# Patient Record
Sex: FEMALE | Race: WHITE | HISPANIC OR LATINO | Employment: FULL TIME | URBAN - METROPOLITAN AREA
[De-identification: names, ages, dates, MRNs, and addresses within clinical notes are randomized per-mention and may not be internally consistent; named-entity substitution may affect disease eponyms.]

---

## 2024-05-31 ENCOUNTER — OFFICE VISIT (OUTPATIENT)
Dept: SPORTS MEDICINE | Facility: CLINIC | Age: 44
End: 2024-05-31
Payer: COMMERCIAL

## 2024-05-31 VITALS
RESPIRATION RATE: 16 BRPM | BODY MASS INDEX: 24.99 KG/M2 | HEIGHT: 65 IN | WEIGHT: 150 LBS | OXYGEN SATURATION: 100 % | TEMPERATURE: 98 F | HEART RATE: 64 BPM

## 2024-05-31 DIAGNOSIS — M51.36 DDD (DEGENERATIVE DISC DISEASE), LUMBAR: ICD-10-CM

## 2024-05-31 DIAGNOSIS — M54.16 RIGHT LUMBAR RADICULITIS: ICD-10-CM

## 2024-05-31 DIAGNOSIS — M54.50 LUMBAR PAIN: Primary | ICD-10-CM

## 2024-05-31 RX ORDER — GABAPENTIN 300 MG/1
300 CAPSULE ORAL NIGHTLY
Qty: 30 CAPSULE | Refills: 0 | Status: SHIPPED | OUTPATIENT
Start: 2024-05-31

## 2024-05-31 RX ORDER — METHYLPREDNISOLONE ACETATE 80 MG/ML
80 INJECTION, SUSPENSION INTRA-ARTICULAR; INTRALESIONAL; INTRAMUSCULAR; SOFT TISSUE ONCE
Status: COMPLETED | OUTPATIENT
Start: 2024-05-31 | End: 2024-05-31

## 2024-05-31 RX ORDER — PREDNISONE 20 MG/1
TABLET ORAL
Qty: 20 TABLET | Refills: 0 | Status: SHIPPED | OUTPATIENT
Start: 2024-05-31

## 2024-05-31 RX ORDER — NORETHINDRONE ACETATE AND ETHINYL ESTRADIOL, ETHINYL ESTRADIOL AND FERROUS FUMARATE 1MG-10(24)
1 KIT ORAL DAILY
COMMUNITY

## 2024-05-31 RX ADMIN — METHYLPREDNISOLONE ACETATE 80 MG: 80 INJECTION, SUSPENSION INTRA-ARTICULAR; INTRALESIONAL; INTRAMUSCULAR; SOFT TISSUE at 13:50

## 2024-05-31 NOTE — PROGRESS NOTES
"Evelin is a 43 y.o. year old female presents to [facility]    Chief Complaint   Patient presents with    Back Pain     Lower back pain since stretching on Monday. Pain and paresthesia down her right leg  to her foot.         History of Present Illness  History of Present Illness  The patient is a 43-year-old female who presents for evaluation of back pain. She is accompanied by her mother.    The patient experienced a sudden, sharp pain in her back while performing stretching exercises on Monday morning. The pain was severe enough to prevent her from getting out of bed, making it challenging to ambulate. The pain, which she describes as radiating from her back down her leg, is particularly severe in her right buttock. This is the first occurrence of such pain. Despite her active lifestyle, she recently completed a marathon. She finds comfort when lying on her left side and sitting for extended periods. On Tuesday, she experienced a near-syncopal episode upon standing, which was alleviated by ibuprofen. Since then, she has experienced episodes of diaphoresis, visual disturbances, and near-fall while attempting to use the bathroom. She has been managing her pain with ibuprofen every 4 hours and icing the affected area. She reports difficulty in defecating due to discomfort while sitting, but denies any loss of bowel control.    I have reviewed the patient's medical, family, and social history in detail and updated the computerized patient record.    Pulse 64   Temp 98 °F (36.7 °C)   Resp 16   Ht 165.1 cm (65\")   Wt 68 kg (150 lb)   SpO2 100%   BMI 24.96 kg/m²      Physical Exam    Vital signs reviewed.   General: No acute distress.  Eyes: conjunctiva clear; pupils equally round and reactive  ENT: external ears atraumatic  CV: no peripheral edema  Resp: normal respiratory effort, no use of accessory muscles  Skin: no rashes or wounds; normal turgor  Psych: mood and affect " appropriate; recent and remote memory intact  Neuro: sensation to light touch intact    MSK Exam  Physical Exam  Lumbar spine demonstrates no paraspinal tenderness. Positive seated slump test on right, negative on the left. Paresthesias extending along the S1 dermatome down to the toe. 2+ DTR L4-S1 bilateral.    Lumbar Spine X-Ray  Indication: Pain  Views: AP and Lateral    Findings:  No fracture  No bony lesion  Normal soft tissues  DDD L5-S1    No prior studies were available for comparison.        Results  Imaging  Degenerative disc at L5-S1.         Diagnoses and all orders for this visit:    Lumbar pain  -     XR Spine Lumbar 2 or 3 View; Future  -     XR Spine Lumbar 2 or 3 View  -     methylPREDNISolone acetate (DEPO-medrol) injection 80 mg  -     predniSONE (DELTASONE) 20 MG tablet; Take 3 tabs daily for 3 d, then take 2 tabs daily for 3 d, then take 1 tab daily for 3 d, then take 0.5 tab daily for 3 d then stop  -     gabapentin (NEURONTIN) 300 MG capsule; Take 1 capsule by mouth Every Night.    Right lumbar radiculitis  -     predniSONE (DELTASONE) 20 MG tablet; Take 3 tabs daily for 3 d, then take 2 tabs daily for 3 d, then take 1 tab daily for 3 d, then take 0.5 tab daily for 3 d then stop  -     gabapentin (NEURONTIN) 300 MG capsule; Take 1 capsule by mouth Every Night.    DDD (degenerative disc disease), lumbar  -     predniSONE (DELTASONE) 20 MG tablet; Take 3 tabs daily for 3 d, then take 2 tabs daily for 3 d, then take 1 tab daily for 3 d, then take 0.5 tab daily for 3 d then stop  -     gabapentin (NEURONTIN) 300 MG capsule; Take 1 capsule by mouth Every Night.    Other orders  -     Lo Loestrin Fe 1 MG-10 MCG / 10 MCG tablet; Take 1 tablet by mouth Daily.  -     Acetylcysteine 500 MG capsule; Take 1 capsule by mouth.      Assessment & Plan  1. Back pain.  The patient's x-ray reveals degenerative discs at L5-S1. A steroid injection will be administered today, followed by a 12-day course of  prednisone. Gabapentin will be prescribed for nerve pain, to be taken at bedtime. The patient is advised to continue with ibuprofen throughout the day.    Follow-up  A follow-up appointment is scheduled for early next week.  Procedure: Following administration of 80 mg IM Depo-Medrol, she felt better. She was able to ambulate out of the office with minimal difficulty.          Follow Up     Patient was given instructions and counseling regarding her condition or for health maintenance advice. Please see specific information pulled into the AVS if appropriate.     Patient or patient representative verbalized consent for the use of Ambient Listening during the visit with  MALINA Zurita Jr., DO for chart documentation. 5/31/2024  14:26 EDT

## 2024-06-05 ENCOUNTER — OFFICE VISIT (OUTPATIENT)
Dept: SPORTS MEDICINE | Facility: CLINIC | Age: 44
End: 2024-06-05
Payer: COMMERCIAL

## 2024-06-05 VITALS
DIASTOLIC BLOOD PRESSURE: 60 MMHG | SYSTOLIC BLOOD PRESSURE: 120 MMHG | WEIGHT: 150 LBS | BODY MASS INDEX: 24.99 KG/M2 | HEART RATE: 65 BPM | OXYGEN SATURATION: 99 % | HEIGHT: 65 IN | RESPIRATION RATE: 16 BRPM

## 2024-06-05 DIAGNOSIS — M51.36 DDD (DEGENERATIVE DISC DISEASE), LUMBAR: ICD-10-CM

## 2024-06-05 DIAGNOSIS — M54.16 RIGHT LUMBAR RADICULITIS: ICD-10-CM

## 2024-06-05 DIAGNOSIS — M54.50 LUMBAR PAIN: Primary | ICD-10-CM

## 2024-06-05 PROCEDURE — 99213 OFFICE O/P EST LOW 20 MIN: CPT | Performed by: FAMILY MEDICINE

## 2024-06-05 RX ORDER — DEXTROAMPHETAMINE SACCHARATE, AMPHETAMINE ASPARTATE, DEXTROAMPHETAMINE SULFATE AND AMPHETAMINE SULFATE 3.75; 3.75; 3.75; 3.75 MG/1; MG/1; MG/1; MG/1
15 TABLET ORAL 3 TIMES DAILY
COMMUNITY
Start: 2024-05-28

## 2024-06-05 NOTE — PROGRESS NOTES
"Evelin is a 43 y.o. year old female presents to [facility]    Chief Complaint   Patient presents with    Lumbar Spine - Follow-up, Pain     F/u eval for lumbar pain with DDD - reports she is feeling better but still having some pain radiating into bilateral lower legs, able to ambulate today without assistance - here for further evaluation and treatment        History of Present Illness  History of Present Illness  The patient is a 43-year-old female who presents for evaluation of back pain.    The patient reports a significant improvement in her condition compared to the previous visit, attributing it to the efficacy of her prescribed medications. Initially, she perceived a slow recovery, however, she has noticed an increased comfort in her mobility over the past two days. Her sleep quality is suboptimal, which she attributes to her parents' couch and preparing to return home today. She has not yet resumed running or engaging in any physical activities due to her inability to sit up for extended periods. Standing is tolerable, however, she experiences aching. Over the past two days, the radiating pain down her leg has lessened. She has transitioned from running to walking laps around her kitchen. She reports no new symptoms, but acknowledges a tingling sensation that has since spread. Her primary concern at present is her ability to engage in more activities. She experienced significant lower back pain during training and running, which she initially attributed to aches and pains.    I have reviewed the patient's medical, family, and social history in detail and updated the computerized patient record.    /60 (BP Location: Right arm, Patient Position: Standing, Cuff Size: Adult)   Pulse 65   Resp 16   Ht 165.1 cm (65\")   Wt 68 kg (150 lb)   SpO2 99%   BMI 24.96 kg/m²      Physical Exam    Vital signs reviewed.   General: No acute distress.  Eyes: conjunctiva clear; " pupils equally round and reactive  ENT: external ears atraumatic  CV: no peripheral edema  Resp: normal respiratory effort, no use of accessory muscles  Skin: no rashes or wounds; normal turgor  Psych: mood and affect appropriate; recent and remote memory intact  Neuro: sensation to light touch intact    MSK Exam  Physical Exam  Lumbar spine demonstrates positive straight leg raise on the right, negative on the left. Negative LAKSHMI and FADIR. Negative Stinchfield bilateral. 2+ DTR L4-S1 bilateral.          Results           Diagnoses and all orders for this visit:    Lumbar pain  -     MRI Lumbar Spine Without Contrast; Future    DDD (degenerative disc disease), lumbar  -     MRI Lumbar Spine Without Contrast; Future    Right lumbar radiculitis  -     MRI Lumbar Spine Without Contrast; Future    Other orders  -     amphetamine-dextroamphetamine (ADDERALL) 15 MG tablet; Take 1 tablet by mouth 3 (Three) Times a Day.      Assessment & Plan  1. Back pain.  An MRI has been ordered to further investigate the cause of the back pain.    Follow-up  A follow-up appointment will be scheduled post-MRI.          Follow Up     Patient was given instructions and counseling regarding her condition or for health maintenance advice. Please see specific information pulled into the AVS if appropriate.     Patient or patient representative verbalized consent for the use of Ambient Listening during the visit with  MALINA Zurita Jr., DO for chart documentation. 6/5/2024  10:08 EDT

## 2024-06-11 DIAGNOSIS — M51.36 DDD (DEGENERATIVE DISC DISEASE), LUMBAR: ICD-10-CM

## 2024-06-11 DIAGNOSIS — M54.50 LUMBAR PAIN: ICD-10-CM

## 2024-06-11 DIAGNOSIS — M54.16 RIGHT LUMBAR RADICULITIS: ICD-10-CM

## 2024-06-11 RX ORDER — PREDNISONE 20 MG/1
TABLET ORAL
Qty: 20 TABLET | Refills: 0 | Status: SHIPPED | OUTPATIENT
Start: 2024-06-11

## 2024-06-12 ENCOUNTER — TELEPHONE (OUTPATIENT)
Dept: SPORTS MEDICINE | Facility: CLINIC | Age: 44
End: 2024-06-12
Payer: COMMERCIAL

## 2024-06-12 ENCOUNTER — HOSPITAL ENCOUNTER (OUTPATIENT)
Dept: MRI IMAGING | Facility: HOSPITAL | Age: 44
Discharge: HOME | End: 2024-06-12
Admitting: FAMILY MEDICINE
Payer: COMMERCIAL

## 2024-06-12 DIAGNOSIS — M54.16 RIGHT LUMBAR RADICULITIS: ICD-10-CM

## 2024-06-12 DIAGNOSIS — M54.50 LUMBAR PAIN: ICD-10-CM

## 2024-06-12 DIAGNOSIS — M51.36 DDD (DEGENERATIVE DISC DISEASE), LUMBAR: ICD-10-CM

## 2024-06-12 PROCEDURE — 72148 MRI LUMBAR SPINE W/O DYE: CPT

## 2024-06-12 NOTE — TELEPHONE ENCOUNTER
Patient was down the gallagher for MRI stopped to asked about scheduled follow up (advised will reach out after hear from Dr. Zurita).     Patient also states Dr Zurita told her after finished steroids if she was still experiencing issues/tingling to let him know and would send in another prescription. Patient says still some tingling but doesn't know if should get another dose pack or not. Confirmed pharmacy and advised will relay info.     Please advise.

## 2024-06-13 DIAGNOSIS — M51.16 LUMBAR DISC HERNIATION WITH RADICULOPATHY: Primary | ICD-10-CM

## 2024-06-13 DIAGNOSIS — M54.16 RIGHT LUMBAR RADICULITIS: ICD-10-CM

## 2024-06-13 NOTE — TELEPHONE ENCOUNTER
Outgoing call to the patient. Appointment made for next week and added to wait list for any sooner appointments. Gave recommendations for Gabapentin increase, all was verbally understood.     Thank you   Keyonna

## 2024-06-14 ENCOUNTER — TELEPHONE (OUTPATIENT)
Dept: SPORTS MEDICINE | Facility: CLINIC | Age: 44
End: 2024-06-14
Payer: COMMERCIAL

## 2024-06-14 NOTE — TELEPHONE ENCOUNTER
Patient is aware of a disc of MRI and XRAY placed at the front that she will need for her upcoming appointment on Monday 6/17/24 at 11am- Patient is aware of office hours as well.  Angel Luis

## 2024-06-14 NOTE — TELEPHONE ENCOUNTER
Patient has an acute disc herniation that is causing mass effect on right S1 nerve root.   Called and scheduled the patient at [facility] [address] [facility] [address] 6/17/2024 at 11:00am

## 2024-06-18 ENCOUNTER — PATIENT MESSAGE (OUTPATIENT)
Dept: SPORTS MEDICINE | Facility: CLINIC | Age: 44
End: 2024-06-18
Payer: COMMERCIAL

## 2025-02-24 ENCOUNTER — OFFICE VISIT (OUTPATIENT)
Dept: SPORTS MEDICINE | Facility: CLINIC | Age: 45
End: 2025-02-24
Payer: COMMERCIAL

## 2025-02-24 VITALS
WEIGHT: 150 LBS | TEMPERATURE: 98 F | RESPIRATION RATE: 16 BRPM | HEART RATE: 77 BPM | DIASTOLIC BLOOD PRESSURE: 78 MMHG | OXYGEN SATURATION: 98 % | HEIGHT: 65 IN | BODY MASS INDEX: 24.99 KG/M2 | SYSTOLIC BLOOD PRESSURE: 114 MMHG

## 2025-02-24 DIAGNOSIS — M75.52 SUBACROMIAL BURSITIS OF LEFT SHOULDER JOINT: ICD-10-CM

## 2025-02-24 DIAGNOSIS — M25.512 ACUTE PAIN OF LEFT SHOULDER: Primary | ICD-10-CM

## 2025-02-24 PROCEDURE — 99213 OFFICE O/P EST LOW 20 MIN: CPT | Performed by: FAMILY MEDICINE

## 2025-02-24 PROCEDURE — 20610 DRAIN/INJ JOINT/BURSA W/O US: CPT | Performed by: FAMILY MEDICINE

## 2025-02-24 RX ORDER — TRIAMCINOLONE ACETONIDE 40 MG/ML
40 INJECTION, SUSPENSION INTRA-ARTICULAR; INTRAMUSCULAR
Status: COMPLETED | OUTPATIENT
Start: 2025-02-24 | End: 2025-02-24

## 2025-02-24 RX ADMIN — TRIAMCINOLONE ACETONIDE 40 MG: 40 INJECTION, SUSPENSION INTRA-ARTICULAR; INTRAMUSCULAR at 12:46

## 2025-02-24 NOTE — PROGRESS NOTES
"Evelin is a 44 y.o. year old female presents to [facility]    Chief Complaint   Patient presents with    Shoulder Pain     Left shoulder pain with above shoulder activity.        History of Present Illness  History of Present Illness  The patient presents for evaluation of left shoulder pain.    She has been experiencing persistent discomfort in her left shoulder, which has escalated to a level that impedes her ability to raise her arm above her head. This issue has been ongoing for approximately one month, with the onset of symptoms coinciding with the administration of an influenza vaccine in late January 2025. The pain is particularly pronounced when she lies on her side and moves her arm in a specific manner, often disrupting her sleep. Morning stiffness is also reported, leading her to adopt a supine sleeping position. She reports no history of major trauma to the shoulder. She has attempted to manage the pain with intermittent use of ibuprofen, which provides temporary relief.    Supplemental Information  She was seen here almost a year ago for her herniated disc and is still dealing with issues with it.    ALLERGIES  The patient has no known allergies.    MEDICATIONS  ibuprofen    IMMUNIZATIONS  She received a flu shot in late January.    I have reviewed the patient's medical, family, and social history in detail and updated the computerized patient record.    /78 (BP Location: Left arm, Patient Position: Sitting, Cuff Size: Adult)   Pulse 77   Temp 98 °F (36.7 °C)   Resp 16   Ht 165.1 cm (65\")   Wt 68 kg (150 lb)   SpO2 98%   BMI 24.96 kg/m²      Physical Exam    Vital signs reviewed.   General: No acute distress.  Eyes: conjunctiva clear; pupils equally round and reactive  ENT: external ears atraumatic  CV: no peripheral edema  Resp: normal respiratory effort, no use of accessory muscles  Skin: no rashes or wounds; normal turgor  Psych: mood and affect " appropriate; recent and remote memory intact  Neuro: sensation to light touch intact    MSK Exam  Physical Exam  In the left shoulder, there is a negative drop arm, negative empty can, positive Singletary, positive Neer's sign, negative belly press, and negative liftoff.      Left Shoulder X-Ray  Indication: Pain  Views: AP Internal and External Rotation    Findings:  No fracture  No bony lesion  Normal soft tissues  Normal joint spaces    No prior studies were available for comparison.      Results      Large Joint Arthrocentesis: L subacromial bursa  Date/Time: 2/24/2025 12:46 PM  Consent given by: patient  Site marked: site marked  Timeout: Immediately prior to procedure a time out was called to verify the correct patient, procedure, equipment, support staff and site/side marked as required   Supporting Documentation  Indications: pain   Procedure Details  Location: shoulder - L subacromial bursa  Needle size: 25 G  Approach: posterior  Medications administered: 40 mg triamcinolone acetonide 40 MG/ML  Patient tolerance: patient tolerated the procedure well with no immediate complications          Diagnoses and all orders for this visit:    Acute pain of left shoulder  -     XR Shoulder 2+ View Left  -     Large Joint Arthrocentesis    Subacromial bursitis of left shoulder joint  -     Large Joint Arthrocentesis      Assessment & Plan  1. Left shoulder bursitis.  The patient's symptoms are consistent with bursitis, likely exacerbated by an influenza injection received in late January. The pain is not indicative of arthritis, and the rotator cuff appears strong and healthy. A cortisone injection was administered into the bursa area to expedite recovery. She was informed that the numbing effect of lidocaine would last for a few hours, and the therapeutic effects of Kenalog would begin within the next few days. She was advised to contact the office if symptoms persist beyond 2 weeks.    PROCEDURE  A cortisone injection  was administered into the bursa area of the left shoulder.          Follow Up     Patient was given instructions and counseling regarding her condition or for health maintenance advice. Please see specific information pulled into the AVS if appropriate.     Patient or patient representative verbalized consent for the use of Ambient Listening during the visit with  MALINA Zurita Jr., DO for chart documentation. 2/24/2025  12:46 EST